# Patient Record
Sex: MALE | Race: WHITE | NOT HISPANIC OR LATINO | Employment: FULL TIME | URBAN - METROPOLITAN AREA
[De-identification: names, ages, dates, MRNs, and addresses within clinical notes are randomized per-mention and may not be internally consistent; named-entity substitution may affect disease eponyms.]

---

## 2018-02-14 ENCOUNTER — OFFICE VISIT (OUTPATIENT)
Dept: ORTHOPEDIC SURGERY | Facility: CLINIC | Age: 43
End: 2018-02-14

## 2018-02-14 VITALS
WEIGHT: 145.28 LBS | HEIGHT: 66 IN | DIASTOLIC BLOOD PRESSURE: 78 MMHG | BODY MASS INDEX: 23.35 KG/M2 | HEART RATE: 75 BPM | SYSTOLIC BLOOD PRESSURE: 108 MMHG

## 2018-02-14 DIAGNOSIS — M25.511 CHRONIC RIGHT SHOULDER PAIN: Primary | ICD-10-CM

## 2018-02-14 DIAGNOSIS — M19.022 PRIMARY OSTEOARTHRITIS OF LEFT ELBOW: ICD-10-CM

## 2018-02-14 DIAGNOSIS — G89.29 CHRONIC RIGHT SHOULDER PAIN: Primary | ICD-10-CM

## 2018-02-14 PROCEDURE — 99213 OFFICE O/P EST LOW 20 MIN: CPT | Performed by: ORTHOPAEDIC SURGERY

## 2018-02-14 NOTE — PROGRESS NOTES
[facility] [place] [facility]    Subjective     Chief Complaint   Patient presents with   • Right Shoulder - Pain   • Left Elbow - Pain        HPI    Hung Perales is a 42 y.o. male. He presents today for evaluation of right shoulder and left elbow pain.  The right shoulder has been bothering him for a few years now, worsening over that timeframe, which is moderate to severe, sharp and stabbing, associated with grinding.  It worsens with lifting.  Old history of injury to the shoulder when he was hit by a bull's horn.  Surgery was done in [place] in 2008.    He also recently injured his elbow, and was told he might have a fracture.  Pain is improving since the day of the injury a few weeks ago.      There is no problem list on file for this patient.    History reviewed. No pertinent past medical history.   Past Surgical History:   Procedure Laterality Date   • HIP SURGERY      GUNNAR   • KNEE SURGERY Left    • MANDIBLE SURGERY     • SHOULDER SURGERY        Family History   Problem Relation Age of Onset   • Cancer Father    • Heart attack Father      Social History     Social History   • Marital status:      Spouse name: N/A   • Number of children: N/A   • Years of education: N/A     Occupational History   • Not on file.     Social History Main Topics   • Smoking status: Former Smoker     Types: Cigarettes     Start date: 1990     Quit date: 2005   • Smokeless tobacco: Never Used   • Alcohol use Yes      Comment: occasional   • Drug use: No   • Sexual activity: Defer     Other Topics Concern   • Not on file     Social History Narrative   • No narrative on file      No current outpatient prescriptions on file prior to visit.     No current facility-administered medications on file prior to visit.       Allergies   Allergen Reactions   • Penicillins Hives        Review of Systems   Constitutional: Negative for activity change, appetite change, chills, diaphoresis, fatigue, fever and unexpected weight  "change.   HENT: Negative for congestion, dental problem, drooling, ear discharge, ear pain, facial swelling, hearing loss, mouth sores, nosebleeds, postnasal drip, rhinorrhea, sinus pressure, sneezing, sore throat, tinnitus, trouble swallowing and voice change.    Eyes: Negative for photophobia, pain, discharge, redness, itching and visual disturbance.   Respiratory: Negative for apnea, cough, choking, chest tightness, shortness of breath, wheezing and stridor.    Cardiovascular: Negative for chest pain, palpitations and leg swelling.   Gastrointestinal: Negative for abdominal distention, abdominal pain, anal bleeding, blood in stool, constipation, diarrhea, nausea, rectal pain and vomiting.   Endocrine: Negative for cold intolerance, heat intolerance, polydipsia, polyphagia and polyuria.   Genitourinary: Negative for decreased urine volume, difficulty urinating, dysuria, enuresis, flank pain, frequency, genital sores, hematuria and urgency.   Musculoskeletal: Positive for arthralgias. Negative for back pain, gait problem, joint swelling, myalgias, neck pain and neck stiffness.   Skin: Negative for color change, pallor, rash and wound.   Allergic/Immunologic: Negative for environmental allergies, food allergies and immunocompromised state.   Neurological: Negative for dizziness, tremors, seizures, syncope, facial asymmetry, speech difficulty, weakness, light-headedness, numbness and headaches.   Hematological: Negative for adenopathy. Does not bruise/bleed easily.   Psychiatric/Behavioral: Negative for agitation, behavioral problems, confusion, decreased concentration, dysphoric mood, hallucinations, self-injury, sleep disturbance and suicidal ideas. The patient is not nervous/anxious and is not hyperactive.         Objective      Physical Exam  /78  Pulse 75  Ht 167.5 cm (65.95\")  Wt 65.9 kg (145 lb 4.5 oz)  BMI 23.49 kg/m2    Body mass index is 23.49 kg/(m^2).    General:   Mental Status:  " Alert   Appearance: Cooperative, in no acute distress   Build and Nutrition: Well-nourished and well developed male   Orientation: Alert and oriented to person, place and time   Posture: Normal   Gait: Normal    Integument:   Right shoulder: Previous skin incisions are well-healed    Neurologic:   Sensation:    Right hand: Intact to light touch in the digits   Motor:  Right upper extremity: 5/5 deltoid, biceps, triceps, wrist flexors, wrist extensors, and interossei    Vascular:   Right upper extremity: 2+ radial pulse, prompt capillary refill    Upper Extremities:   Right Shoulder:    Tenderness:  None    Swelling:  None    Crepitus: None    Atrophy:  None    Range of motion:  External rotation:  70°       Forward flexion:  180°       Abduction:   180°  Instability:  None  Deformities:  None  Functional testing: Negative drop arm, negative lift-off, positive impingement    Integument:   Left elbow: No skin lesions, no rash, no ecchymosis    Neurologic:   Sensation:    Left hand: Intact to light touch in the digits   Motor:  Left upper extremity: 5/5 biceps, triceps, wrist flexors, wrist extensors, and interossei  Vascular:   Left upper extremity: 2+ radial pulse, prompt capillary refill    Upper Extremities:   Left Elbow:    Tenderness:  None    Effusion:  None    Swelling:  None    Crepitus:  None    Atrophy:  None    Range of motion:  Extension:  0°        Flexion:  140°       Pronation:  90°       Supination: 90°  Instability:  None  Deformities:  None      Imaging/Studies  Imaging Results (last 24 hours)     Procedure Component Value Units Date/Time    XR Shoulder 2+ View Right [[id number]] Resulted:  02/14/18 1720     Updated:  02/14/18 1721    Narrative:       Right Shoulder Radiographs  Indication: right shoulder pain  Views: AP, outlet and axillary views of the right shoulder    Comparison: no prior studies available for review    Findings:  Type III acromion, with evidence of previous distal clavicle  resection,   with a small calcification at the distal end of the clavicle in the   acromioclavicular joint interval, with a mildly high riding clavicle.      XR Elbow 3+ View Left [[id number]] Resulted:  02/14/18 1721     Updated:  02/14/18 1722    Narrative:       Left Elbow Radiographs  Indication: left elbow pain  Views: AP, lateral, and oblique views of the left elbow    Comparison: no prior studies available for review    Findings:  Mild arthritic changes are seen, primarily at the proximal radial ulnar   joint, with mild spurring anteriorly and posteriorly off of the olecranon.            Assessment and Plan     Hung was seen today for pain and pain.    Diagnoses and all orders for this visit:    Chronic right shoulder pain  -     XR Shoulder 2+ View Right  -     FL Contrast Injection CT / MRI; Future  -     MRI shoulder right arthrogram; Future    Primary osteoarthritis of left elbow  -     XR Elbow 3+ View Left        I reviewed my findings with patient today.  His right shoulder is bothering him, and I suspect that he has an element of impingement.  Also suspect a possible labral or biceps injury.  At this point, recommended an MRI with intra-articular contrast.  I will see him back after that is completed for review.    With regards to his left elbow, that is improving.  I see no evidence of fracture.  He does have some mild arthritis in the elbow.  Conservative treatment was recommended.    Return for After Imaging Study.      Medical Decision Making  Data/Risk: radiology tests and independent visualization of imaging, lab tests, or EMG/NCV      Carter Abreu MD  02/14/18  5:44 PM

## 2018-02-22 ENCOUNTER — TELEPHONE (OUTPATIENT)
Dept: ORTHOPEDIC SURGERY | Facility: CLINIC | Age: 43
End: 2018-02-22

## 2018-02-22 DIAGNOSIS — G89.29 CHRONIC RIGHT SHOULDER PAIN: Primary | ICD-10-CM

## 2018-02-22 DIAGNOSIS — M25.511 CHRONIC RIGHT SHOULDER PAIN: Primary | ICD-10-CM

## 2018-03-16 ENCOUNTER — HOSPITAL ENCOUNTER (OUTPATIENT)
Dept: GENERAL RADIOLOGY | Facility: HOSPITAL | Age: 43
Discharge: HOME | End: 2018-03-16
Attending: ORTHOPAEDIC SURGERY

## 2018-03-16 ENCOUNTER — HOSPITAL ENCOUNTER (OUTPATIENT)
Dept: MRI IMAGING | Facility: HOSPITAL | Age: 43
Discharge: HOME | End: 2018-03-16
Attending: ORTHOPAEDIC SURGERY | Admitting: ORTHOPAEDIC SURGERY

## 2018-03-16 DIAGNOSIS — G89.29 CHRONIC RIGHT SHOULDER PAIN: ICD-10-CM

## 2018-03-16 DIAGNOSIS — M25.511 CHRONIC RIGHT SHOULDER PAIN: ICD-10-CM

## 2018-03-16 PROCEDURE — 77002 NEEDLE LOCALIZATION BY XRAY: CPT

## 2018-03-16 PROCEDURE — A9577 INJ MULTIHANCE: HCPCS | Performed by: ORTHOPAEDIC SURGERY

## 2018-03-16 PROCEDURE — 73222 MRI JOINT UPR EXTREM W/DYE: CPT

## 2018-03-16 PROCEDURE — 0 GADOBENATE DIMEGLUMINE 529 MG/ML SOLUTION: Performed by: ORTHOPAEDIC SURGERY

## 2018-03-16 PROCEDURE — 0 IOPAMIDOL 61 % SOLUTION: Performed by: ORTHOPAEDIC SURGERY

## 2018-03-16 RX ORDER — LIDOCAINE HYDROCHLORIDE 10 MG/ML
5 INJECTION, SOLUTION INFILTRATION; PERINEURAL ONCE
Status: COMPLETED | OUTPATIENT
Start: 2018-03-16 | End: 2018-03-16

## 2018-03-16 RX ORDER — LIDOCAINE HYDROCHLORIDE 10 MG/ML
5 INJECTION, SOLUTION EPIDURAL; INFILTRATION; INTRACAUDAL; PERINEURAL ONCE
Status: DISCONTINUED | OUTPATIENT
Start: 2018-03-16 | End: 2018-03-17 | Stop reason: HOSPADM

## 2018-03-16 RX ADMIN — GADOBENATE DIMEGLUMINE 1 ML: 529 INJECTION, SOLUTION INTRAVENOUS at 13:04

## 2018-03-16 RX ADMIN — LIDOCAINE HYDROCHLORIDE 5 ML: 10 INJECTION, SOLUTION INFILTRATION; PERINEURAL at 13:05

## 2018-03-16 RX ADMIN — IOPAMIDOL 10 ML: 612 INJECTION, SOLUTION INTRAVENOUS at 13:04

## 2018-03-19 ENCOUNTER — OFFICE VISIT (OUTPATIENT)
Dept: ORTHOPEDIC SURGERY | Facility: CLINIC | Age: 43
End: 2018-03-19

## 2018-03-19 VITALS
WEIGHT: 142.42 LBS | DIASTOLIC BLOOD PRESSURE: 85 MMHG | BODY MASS INDEX: 22.89 KG/M2 | SYSTOLIC BLOOD PRESSURE: 109 MMHG | HEIGHT: 66 IN | HEART RATE: 66 BPM

## 2018-03-19 DIAGNOSIS — M75.121 COMPLETE TEAR OF RIGHT ROTATOR CUFF: Primary | ICD-10-CM

## 2018-03-19 PROCEDURE — 99214 OFFICE O/P EST MOD 30 MIN: CPT | Performed by: ORTHOPAEDIC SURGERY

## 2018-03-19 NOTE — PROGRESS NOTES
[facility] [place] [facility]    Subjective     Chief Complaint   Patient presents with   • Right Shoulder - Follow-up     Post MRI         HPI    Hung Perales is a 43 y.o. male. He follows up today for his right shoulder.  MRI has been completed, and he is here today for results.  Pain continues to be moderate, stabbing, associated with popping, grinding, stiffness and giving way.      There is no problem list on file for this patient.    History reviewed. No pertinent past medical history.   Past Surgical History:   Procedure Laterality Date   • HIP SURGERY      GUNNAR   • KNEE SURGERY Left    • MANDIBLE SURGERY     • SHOULDER SURGERY        Family History   Problem Relation Age of Onset   • Cancer Father    • Heart attack Father      Social History     Social History   • Marital status:      Spouse name: N/A   • Number of children: N/A   • Years of education: N/A     Occupational History   • Not on file.     Social History Main Topics   • Smoking status: Former Smoker     Types: Cigarettes     Start date: 1990     Quit date: 2005   • Smokeless tobacco: Never Used   • Alcohol use Yes      Comment: occasional   • Drug use: No   • Sexual activity: Defer     Other Topics Concern   • Not on file     Social History Narrative   • No narrative on file      No current outpatient prescriptions on file prior to visit.     No current facility-administered medications on file prior to visit.       Allergies   Allergen Reactions   • Penicillins Hives        Review of Systems   Constitutional: Negative for activity change, appetite change, chills, diaphoresis, fatigue, fever and unexpected weight change.   HENT: Negative for congestion, dental problem, drooling, ear discharge, ear pain, facial swelling, hearing loss, mouth sores, nosebleeds, postnasal drip, rhinorrhea, sinus pressure, sneezing, sore throat, tinnitus, trouble swallowing and voice change.    Eyes: Negative for photophobia, pain, discharge,  "redness, itching and visual disturbance.   Respiratory: Negative for apnea, cough, choking, chest tightness, shortness of breath, wheezing and stridor.    Cardiovascular: Negative for chest pain, palpitations and leg swelling.   Gastrointestinal: Negative for abdominal distention, abdominal pain, anal bleeding, blood in stool, constipation, diarrhea, nausea, rectal pain and vomiting.   Endocrine: Negative for cold intolerance, heat intolerance, polydipsia, polyphagia and polyuria.   Genitourinary: Negative for decreased urine volume, difficulty urinating, dysuria, enuresis, flank pain, frequency, genital sores, hematuria and urgency.   Musculoskeletal: Positive for joint swelling. Negative for arthralgias, back pain, gait problem, myalgias, neck pain and neck stiffness.        Shoulder Pain    Skin: Negative for color change, pallor, rash and wound.   Allergic/Immunologic: Negative for environmental allergies, food allergies and immunocompromised state.   Neurological: Negative for dizziness, tremors, seizures, syncope, facial asymmetry, speech difficulty, weakness, light-headedness, numbness and headaches.   Hematological: Negative for adenopathy. Does not bruise/bleed easily.   Psychiatric/Behavioral: Negative for agitation, behavioral problems, confusion, decreased concentration, dysphoric mood, hallucinations, self-injury, sleep disturbance and suicidal ideas. The patient is not nervous/anxious and is not hyperactive.         Objective      Physical Exam  /85   Pulse 66   Ht 167.5 cm (65.95\")   Wt 64.6 kg (142 lb 6.7 oz)   BMI 23.03 kg/m²     Body mass index is 23.03 kg/m².    General:   Mental Status:  Alert   Appearance: Cooperative, in no acute distress   Build and Nutrition: Well-nourished and well developed male   Orientation: Alert and oriented to person, place and time   Posture: Normal   Gait: Normal    Integument:   Right shoulder: No skin lesions, no rash, no ecchymosis    Upper " Extremities:   Right Shoulder:    Tenderness:  None    Swelling:  None    Range of motion:  External rotation:  80°       Forward flexion:  180°       Abduction:   180°  Deformities:  None  Functional testing: Negative drop arm, negative lift-off        Imaging/Studies  MRI right shoulder:  IMPRESSION:     Evidence of abnormal injury and dissociation of the upper third of the  anterior labrum which resumes normal appearance in the middle and lower  third of the anterior labrum. Therefore labral instability anteriorly  should not be present because of the critical portion of the anterior  labrum being intact.     There is evidence of disruption corrugation with abnormal signal of the  middle glenohumeral ligament.     There is minimal posterior capsular stripping although there is no  evidence of posterior labral tear.     Subscapularis and teres minor are intact.     Previous distal clavicular osteotomy has been performed.     There is full-thickness inhomogeneous tear of the rotator cuff tendons  with only 1.5 cm of central cuff retraction but the anterior most and  posterior-most attachments are intact to prevent high-grade retraction  of the cuff. There is abnormal signal and there is diffuse contrast  through the rotator cuff tear into the subacromial and subdeltoid bursal  spaces.     The biceps anchor is intact but there is evidence of an undersurface  tear of the superior labrum diffusely from the anterior attachment to  its posterior extent. Superior labral complete dissociation is not  identified but this is a significant superior labral abnormality.     There is marked arthropathic irregular bone fragmentation along the  greater tuberosity of the humerus.      Assessment and Plan     Hung was seen today for follow-up.    Diagnoses and all orders for this visit:    Complete tear of right rotator cuff  -     External Facility Surgical/Procedural Request        I reviewed my findings with patient today.  His  MR arthrogram does demonstrate a rotator cuff tear, we discussed treatment options.  No improvement with conservative treatment, therefore he would like to proceed with surgical repair.  The possibility that the tear may not be repairable has been discussed.  Please see my counseling note for details.  We will plan for a right shoulder arthroscopy with subacromial decompression, and mini open rotator cuff repair at a mutually convenient time.    Surgical Counseling     After examining the patient and reviewing the diagnostic studies, I discussed the non-operative and surgical options for their shoulder problem. The risks, benefits and alternatives to surgical intervention were discussed, and the patient wishes to proceed with operative intervention.  Risks were discussed, which included, but are not limited to: bleeding, infection, damage to blood vessels and nerves, no improvement in symptoms, worsening symptoms, incomplete pain relief,need for further surgery, shoulder stiffness, failure of the repair and anesthetic complications.  The patient understands, consents, and their questions were answered.  The typical rehabilitative course was also discussed.  We will schedule surgery at a mutually convenient time in the near future.    Return for For surgery as planned.      Medical Decision Making  Management Options : major surgery with risk factors  Data/Risk: independent visualization of imaging, lab tests, or EMG/NCV      Carter Abreu MD  03/19/18  10:39 AM

## 2018-05-11 ENCOUNTER — OUTSIDE FACILITY SERVICE (OUTPATIENT)
Dept: ORTHOPEDIC SURGERY | Facility: CLINIC | Age: 43
End: 2018-05-11

## 2018-05-11 PROCEDURE — 29823 SHO ARTHRS SRG XTNSV DBRDMT: CPT | Performed by: ORTHOPAEDIC SURGERY

## 2018-05-11 PROCEDURE — 23412 REPAIR ROTATOR CUFF CHRONIC: CPT | Performed by: ORTHOPAEDIC SURGERY

## 2018-05-23 ENCOUNTER — OFFICE VISIT (OUTPATIENT)
Dept: ORTHOPEDIC SURGERY | Facility: CLINIC | Age: 43
End: 2018-05-23

## 2018-05-23 DIAGNOSIS — Z47.89 ORTHOPEDIC AFTERCARE: Primary | ICD-10-CM

## 2018-05-23 PROCEDURE — 99024 POSTOP FOLLOW-UP VISIT: CPT | Performed by: ORTHOPAEDIC SURGERY

## 2018-05-23 RX ORDER — HYDROCODONE BITARTRATE AND ACETAMINOPHEN 5; 325 MG/1; MG/1
TABLET ORAL
COMMUNITY
Start: 2018-05-11 | End: 2018-05-23

## 2018-05-23 NOTE — PROGRESS NOTES
[facility] – [place] [facility] Note    Subjective     Chief Complaint   Patient presents with   • Post-op     2 weeks s/p: Right shoulder arthroscopy 5/11/18        HPI    Hung Perales is a 43 y.o. male. Follows up today status post right shoulder arthroscopy with rotator cuff repair.  He's doing well today.  No pain.  Doing his pendulum and elbow exercises only.      There is no problem list on file for this patient.    History reviewed. No pertinent past medical history.   Past Surgical History:   Procedure Laterality Date   • HIP SURGERY      GUNNAR   • KNEE SURGERY Left    • MANDIBLE SURGERY     • SHOULDER SURGERY        Family History   Problem Relation Age of Onset   • Cancer Father    • Heart attack Father      Social History     Social History   • Marital status:      Spouse name: N/A   • Number of children: N/A   • Years of education: N/A     Occupational History   • Not on file.     Social History Main Topics   • Smoking status: Former Smoker     Types: Cigarettes     Start date: 1990     Quit date: 2005   • Smokeless tobacco: Never Used   • Alcohol use Yes      Comment: occasional   • Drug use: No   • Sexual activity: Defer     Other Topics Concern   • Not on file     Social History Narrative   • No narrative on file      No current outpatient prescriptions on file prior to visit.     No current facility-administered medications on file prior to visit.       Allergies   Allergen Reactions   • Penicillins Hives        Review of Systems   Constitutional: Negative for activity change, appetite change, chills, diaphoresis, fatigue, fever and unexpected weight change.   HENT: Negative for congestion, dental problem, drooling, ear discharge, ear pain, facial swelling, hearing loss, mouth sores, nosebleeds, postnasal drip, rhinorrhea, sinus pressure, sneezing, sore throat, tinnitus, trouble swallowing and voice change.    Eyes: Negative for photophobia, pain, discharge, redness, itching and visual  disturbance.   Respiratory: Negative for apnea, cough, choking, chest tightness, shortness of breath, wheezing and stridor.    Cardiovascular: Negative for chest pain, palpitations and leg swelling.   Gastrointestinal: Negative for abdominal distention, abdominal pain, anal bleeding, blood in stool, constipation, diarrhea, nausea, rectal pain and vomiting.   Endocrine: Negative for cold intolerance, heat intolerance, polydipsia, polyphagia and polyuria.   Genitourinary: Negative for decreased urine volume, difficulty urinating, dysuria, enuresis, flank pain, frequency, genital sores, hematuria and urgency.   Musculoskeletal: Positive for arthralgias (s/p shoulder scope). Negative for back pain, gait problem, joint swelling, myalgias, neck pain and neck stiffness.   Skin: Negative for color change, pallor, rash and wound.   Allergic/Immunologic: Negative for environmental allergies, food allergies and immunocompromised state.   Neurological: Negative for dizziness, tremors, seizures, syncope, facial asymmetry, speech difficulty, weakness, light-headedness, numbness and headaches.   Hematological: Negative for adenopathy. Does not bruise/bleed easily.   Psychiatric/Behavioral: Negative for agitation, behavioral problems, confusion, decreased concentration, dysphoric mood, hallucinations, self-injury, sleep disturbance and suicidal ideas. The patient is not nervous/anxious and is not hyperactive.         Objective      Physical Exam  There were no vitals taken for this visit.    There is no height or weight on file to calculate BMI.    General:   Mental Status:  Alert   Appearance: Cooperative, in no acute distress   Build and Nutrition: Well-nourished and well developed male   Orientation: Alert and oriented to person, place and time   Posture: Normal   Gait: Normal    Integument:   Right shoulder: Wounds are well-healed with no signs of infection    Upper Extremities:   Right Shoulder: Sensation and motor intact in  the upper extremity          Assessment and Plan     Hung was seen today for post-op.    Diagnoses and all orders for this visit:    Orthopedic aftercare        I reviewed my findings with patient today.  He's doing well following his right shoulder rotator cuff repair, and I will see him back in 2 weeks, at which point we will more than likely initiate physical therapy.  I will see him back sooner for any problems.    Return in about 2 weeks (around 6/6/2018).        Carter Abreu MD  05/23/18  10:39 AM

## 2018-06-06 ENCOUNTER — OFFICE VISIT (OUTPATIENT)
Dept: ORTHOPEDIC SURGERY | Facility: CLINIC | Age: 43
End: 2018-06-06

## 2018-06-06 DIAGNOSIS — M75.121 COMPLETE TEAR OF RIGHT ROTATOR CUFF: ICD-10-CM

## 2018-06-06 DIAGNOSIS — Z47.89 ORTHOPEDIC AFTERCARE: Primary | ICD-10-CM

## 2018-06-06 PROCEDURE — 99024 POSTOP FOLLOW-UP VISIT: CPT | Performed by: PHYSICIAN ASSISTANT

## 2018-06-06 RX ORDER — NAPROXEN SODIUM 220 MG
220 TABLET ORAL AS NEEDED
COMMUNITY
End: 2019-09-09

## 2018-06-06 RX ORDER — ACETAMINOPHEN 500 MG
500 TABLET ORAL AS NEEDED
COMMUNITY
End: 2019-09-09

## 2018-06-06 NOTE — PROGRESS NOTES
[facility] – [place] [facility] Note    Subjective     Patient: Hung Perales  : 1975    Primary Care Provider: Oscar Baez MD    Requesting Provider: As above    Post-op (2 weeks - - Rt shoulder arthroscopy 18)      History    Chief Complaint: Status post right rotator cuff repair 18 with Dr. Abreu    History of Present Illness: Patient presents 1 month status post right rotator cuff repair.  Patient reports he has been compliant with using his sling and coming out to do pendulum exercises.  He reports minimal pain in the shoulder.  No new symptoms.    No current outpatient prescriptions on file prior to visit.     No current facility-administered medications on file prior to visit.       Allergies   Allergen Reactions   • Penicillins Hives      History reviewed. No pertinent past medical history.  Past Surgical History:   Procedure Laterality Date   • HIP SURGERY      GUNNAR   • KNEE SURGERY Left    • MANDIBLE SURGERY     • SHOULDER SURGERY Right     18- [facility]     Family History   Problem Relation Age of Onset   • Cancer Father    • Heart attack Father       Social History     Social History   • Marital status:      Spouse name: N/A   • Number of children: N/A   • Years of education: N/A     Occupational History   • Not on file.     Social History Main Topics   • Smoking status: Former Smoker     Types: Cigarettes     Start date:      Quit date:    • Smokeless tobacco: Never Used   • Alcohol use Yes      Comment: occasional   • Drug use: No   • Sexual activity: Defer     Other Topics Concern   • Not on file     Social History Narrative   • No narrative on file        Review of Systems    The following portions of the patient's history were reviewed and updated as appropriate: allergies, current medications, past family history, past medical history, past social history, past surgical history and problem list.      Objective      Physical Exam  There were no vitals taken  for this visit.    There is no height or weight on file to calculate BMI.      Ortho Exam  Right shoulder exam:  Incisions well healed  Forward flexion 90°, abduction 90°, external rotation 20°  Neurovascular intact distally    Medical Decision Making    Data Review:   none    Assessment:  1. Orthopedic aftercare    2. Complete tear of right rotator cuff        Plan:  Doing well 1 month status post right rotator cuff repair.  Patient's incisions are well-healed.  Plan today is that he begin physical therapy working on strength motion and scapular stabilization.  He can continue to use the sling as needed for the next couple of weeks.  He'll return to work on 6/18/18 with light duty and no lifting with right upper extremity.  He'll return to see Dr. Abreu in 1 month or sooner if needed.    Patient history, diagnosis and treatment plan discussed with Dr. Abreu.        Dolores Gonzales PA-C  06/06/18  11:22 AM

## 2018-06-12 ENCOUNTER — TELEPHONE (OUTPATIENT)
Dept: ORTHOPEDIC SURGERY | Facility: CLINIC | Age: 43
End: 2018-06-12

## 2018-06-12 NOTE — TELEPHONE ENCOUNTER
I called the patient to let him know that Dolores is releasing the patient to drive, but that it is up to his employer if he can drive the company car.  He had no further questions.  Kesha

## 2018-06-12 NOTE — TELEPHONE ENCOUNTER
PATIENT WANTS TO KNOW IF HE WOULD STILL BE ALLOWED TO DRIVE HIS COMPANY VEHICLE WHEN RETURNING TO WORK ON 6/18.

## 2018-07-11 ENCOUNTER — OFFICE VISIT (OUTPATIENT)
Dept: ORTHOPEDIC SURGERY | Facility: CLINIC | Age: 43
End: 2018-07-11

## 2018-07-11 DIAGNOSIS — Z47.89 ORTHOPEDIC AFTERCARE: Primary | ICD-10-CM

## 2018-07-11 PROCEDURE — 99024 POSTOP FOLLOW-UP VISIT: CPT | Performed by: PHYSICIAN ASSISTANT

## 2018-07-11 NOTE — PROGRESS NOTES
[facility] – [place], [place] Orthopaedic Surgery Clinic Note    Subjective     Patient: Hung Perales  : 1975    Primary Care Provider: Oscar Baez MD    Requesting Provider: As above    Post-op Follow-up (8 weeks s/p Rt shoulder arthroscopy 18)      History    History of Present Illness: Follow-up right rotator cuff repair with Dr. Abreu 18.  Patient is return to work.  He is doing physical therapy.  He reports no pain and no new symptoms.    Current Outpatient Prescriptions on File Prior to Visit   Medication Sig Dispense Refill   • acetaminophen (TYLENOL) 500 MG tablet Take 500 mg by mouth As Needed for Mild Pain .     • naproxen sodium (ALEVE) 220 MG tablet Take 220 mg by mouth As Needed.       No current facility-administered medications on file prior to visit.       Allergies   Allergen Reactions   • Penicillins Hives      History reviewed. No pertinent past medical history.  Past Surgical History:   Procedure Laterality Date   • HIP SURGERY      GUNNAR   • KNEE SURGERY Left    • MANDIBLE SURGERY     • SHOULDER SURGERY Right     18- [facility]     Family History   Problem Relation Age of Onset   • Cancer Father    • Heart attack Father       Social History     Social History   • Marital status:      Spouse name: N/A   • Number of children: N/A   • Years of education: N/A     Occupational History   • Not on file.     Social History Main Topics   • Smoking status: Former Smoker     Types: Cigarettes     Start date:      Quit date:    • Smokeless tobacco: Never Used   • Alcohol use Yes      Comment: occasional   • Drug use: No   • Sexual activity: Defer     Other Topics Concern   • Not on file     Social History Narrative   • No narrative on file        Review of Systems    The following portions of the patient's history were reviewed and updated as appropriate: allergies, current medications, past family history, past medical history, past social history, past surgical history and problem  list.      Objective      Physical Exam  There were no vitals taken for this visit.    There is no height or weight on file to calculate BMI.    Ortho Exam  Musculoskeletal   Upper Extremity   Right Shoulder     Inspection and Palpation:     Tenderness - none with well-healed portal incisions    Crepitus - none    Sensation is normal    Examination reveals no ecchymosis.      Strength and Tone:    Supraspinatus - 5/5    External Rotators-5/5    Infraspinatus - 5/5    Subscapularis - 5/5    Deltoid - 5/5     Range of Motion   Right shoulder:    Internal Rotation: ROM - T7    External Rotation: AROM - 60 degrees    Elevation through flexion: AROM - 160 degrees    Left Shoulder:    Internal Rotation: ROM - T7    External Rotation: AROM - 80 degrees    Elevation through flexion: AROM - 180 degrees           Medical Decision Making    Data Review:   none    Assessment:  1. Orthopedic aftercare        Plan:  Doing well status post right rotator cuff repair with Dr. Abreu 5/11/18.  Patient reports no pain.  He has good motion and strength.  Encouraged his him to continue his therapy and exercises at home.  He'll return in 2 months to see Dr. Abreu or sooner if needed.    Patient history, diagnosis and treatment plan discussed with Dr. Abreu.      Dolores Gonzales PA-C  07/12/18  2:09 PM

## 2018-09-12 ENCOUNTER — OFFICE VISIT (OUTPATIENT)
Dept: ORTHOPEDIC SURGERY | Facility: CLINIC | Age: 43
End: 2018-09-12

## 2018-09-12 VITALS — HEART RATE: 87 BPM | HEIGHT: 66 IN | WEIGHT: 140.21 LBS | BODY MASS INDEX: 22.53 KG/M2 | OXYGEN SATURATION: 98 %

## 2018-09-12 DIAGNOSIS — Z47.89 ORTHOPEDIC AFTERCARE: Primary | ICD-10-CM

## 2018-09-12 PROCEDURE — 99212 OFFICE O/P EST SF 10 MIN: CPT | Performed by: ORTHOPAEDIC SURGERY

## 2018-09-12 NOTE — PROGRESS NOTES
[facility] [place] [place]    Subjective     Chief Complaint   Patient presents with   • Follow-up     2 month follow up - 4 months status post Rt shoulder arthroscopy 5/11/18        HPI    Hung Perales is a 43 y.o. male.  He follows up today for his right shoulder.  He is doing well today.  No complaints.  Does have some mild anterior pain on occasion.  He still doing physical therapy.  Symptoms have improved compared to his preoperative symptoms.      There is no problem list on file for this patient.    History reviewed. No pertinent past medical history.   Past Surgical History:   Procedure Laterality Date   • HIP SURGERY      GUNNAR   • KNEE SURGERY Left    • MANDIBLE SURGERY     • SHOULDER SURGERY Right     5/11/18- [facility]      Family History   Problem Relation Age of Onset   • Cancer Father    • Heart attack Father      Social History     Social History   • Marital status:      Spouse name: N/A   • Number of children: N/A   • Years of education: N/A     Occupational History   • Not on file.     Social History Main Topics   • Smoking status: Former Smoker     Types: Cigarettes     Start date: 1990     Quit date: 2005   • Smokeless tobacco: Never Used   • Alcohol use Yes      Comment: occasional   • Drug use: No   • Sexual activity: Defer     Other Topics Concern   • Not on file     Social History Narrative   • No narrative on file      Current Outpatient Prescriptions on File Prior to Visit   Medication Sig Dispense Refill   • acetaminophen (TYLENOL) 500 MG tablet Take 500 mg by mouth As Needed for Mild Pain .     • naproxen sodium (ALEVE) 220 MG tablet Take 220 mg by mouth As Needed.       No current facility-administered medications on file prior to visit.       Allergies   Allergen Reactions   • Penicillins Hives        Review of Systems   Constitutional: Negative.    HENT: Negative.    Eyes: Negative.    Respiratory: Negative.    Cardiovascular: Negative.    Gastrointestinal: Negative.   "  Endocrine: Negative.    Genitourinary: Negative.    Musculoskeletal: Positive for arthralgias.   Skin: Negative.    Allergic/Immunologic: Negative.    Neurological: Negative.    Hematological: Negative.    Psychiatric/Behavioral: Negative.         Objective      Physical Exam  Pulse 87   Ht 167.5 cm (65.95\")   Wt 63.6 kg (140 lb 3.4 oz)   SpO2 98%   BMI 22.67 kg/m²     Body mass index is 22.67 kg/m².    General:   Mental Status:  Alert   Appearance: Cooperative, in no acute distress   Build and Nutrition: Well-nourished and well developed male   Orientation: Alert and oriented to person, place and time   Posture: Normal   Gait: Normal    Integument:   Right shoulder: Wound is well-healed with no signs of infection    Upper Extremities:   Right Shoulder:    Tenderness:  None    Swelling:  None    Range of motion:  External rotation:  50°       Forward flexion:  180°       Abduction:   180°  Deformities:  None  Functional testing: Negative drop arm, negative lift-off, negative impingement            Assessment and Plan     Hung was seen today for follow-up.    Diagnoses and all orders for this visit:    Orthopedic aftercare        I reviewed my findings with patient today.  He is doing well following his rotator cuff repair.  He will continue to finish out his physical therapy, and I will see him back if there are any problems with the shoulder in the future.  He is pleased with results.    Return if symptoms worsen or fail to improve.      Medical Decision Making  Management Options : physical/occupational therapy      Carter Abreu MD  09/12/18  4:12 PM  "

## 2019-09-09 ENCOUNTER — OFFICE VISIT (OUTPATIENT)
Dept: ORTHOPEDIC SURGERY | Facility: CLINIC | Age: 44
End: 2019-09-09

## 2019-09-09 VITALS — OXYGEN SATURATION: 99 % | HEART RATE: 94 BPM | BODY MASS INDEX: 23.42 KG/M2 | WEIGHT: 145.72 LBS | HEIGHT: 66 IN

## 2019-09-09 DIAGNOSIS — M25.511 CHRONIC RIGHT SHOULDER PAIN: Primary | ICD-10-CM

## 2019-09-09 DIAGNOSIS — G89.29 CHRONIC RIGHT SHOULDER PAIN: Primary | ICD-10-CM

## 2019-09-09 PROCEDURE — 99214 OFFICE O/P EST MOD 30 MIN: CPT | Performed by: ORTHOPAEDIC SURGERY

## 2019-09-09 NOTE — PROGRESS NOTES
[facility] – [place] Orthopaedic Surgery Clinic Note    Subjective     Chief Complaint   Patient presents with   • Right Shoulder - Pain     1 year f/u, Rt shoulder arthroscopy 18        HPI    Hung ORDONEZ Angella is a 44 y.o. male.  He presents today for evaluation of right shoulder pain.  He is had pain for 6 months, which is been slowly worsening over that timeframe, with pain in the bicipital groove region in particular.  Giving way on occasion, and specifically when he went to go swat a wasp with his hat he had the onset of acute pain in that area.  The pain is aching, stabbing and shooting.  History of right shoulder arthroscopy with rotator cuff repair in May 2018.      There is no problem list on file for this patient.    History reviewed. No pertinent past medical history.   Past Surgical History:   Procedure Laterality Date   • HIP SURGERY      GUNNAR   • KNEE SURGERY Left    • MANDIBLE SURGERY     • SHOULDER SURGERY Right     18- [facility]      Family History   Problem Relation Age of Onset   • Cancer Father    • Heart attack Father      Social History     Socioeconomic History   • Marital status:      Spouse name: Not on file   • Number of children: Not on file   • Years of education: Not on file   • Highest education level: Not on file   Tobacco Use   • Smoking status: Former Smoker     Types: Cigarettes     Start date:      Last attempt to quit:      Years since quittin.6   • Smokeless tobacco: Never Used   Substance and Sexual Activity   • Alcohol use: Yes     Comment: occasional   • Drug use: No   • Sexual activity: Defer      Current Outpatient Medications on File Prior to Visit   Medication Sig Dispense Refill   • [DISCONTINUED] acetaminophen (TYLENOL) 500 MG tablet Take 500 mg by mouth As Needed for Mild Pain .     • [DISCONTINUED] naproxen sodium (ALEVE) 220 MG tablet Take 220 mg by mouth As Needed.       No current facility-administered medications on file prior to visit.       Allergies    Allergen Reactions   • Penicillins Hives        Review of Systems   Constitutional: Negative.  Negative for activity change, appetite change, chills, diaphoresis, fatigue, fever and unexpected weight change.   HENT: Negative.  Negative for congestion, dental problem, drooling, ear discharge, ear pain, facial swelling, hearing loss, mouth sores, nosebleeds, postnasal drip, rhinorrhea, sinus pressure, sneezing, sore throat, tinnitus, trouble swallowing and voice change.    Eyes: Negative.  Negative for photophobia, pain, discharge, redness, itching and visual disturbance.   Respiratory: Negative.  Negative for apnea, cough, choking, chest tightness, shortness of breath, wheezing and stridor.    Cardiovascular: Negative.  Negative for chest pain, palpitations and leg swelling.   Gastrointestinal: Negative.  Negative for abdominal distention, abdominal pain, anal bleeding, blood in stool, constipation, diarrhea, nausea, rectal pain and vomiting.   Endocrine: Negative.  Negative for cold intolerance, heat intolerance, polydipsia, polyphagia and polyuria.   Genitourinary: Negative.  Negative for decreased urine volume, difficulty urinating, dysuria, enuresis, flank pain, frequency, genital sores, hematuria and urgency.   Musculoskeletal: Positive for arthralgias. Negative for back pain, gait problem, joint swelling, myalgias, neck pain and neck stiffness.   Skin: Negative.  Negative for color change, pallor, rash and wound.   Allergic/Immunologic: Negative.  Negative for environmental allergies, food allergies and immunocompromised state.   Neurological: Negative.  Negative for dizziness, tremors, seizures, syncope, facial asymmetry, speech difficulty, weakness, light-headedness, numbness and headaches.   Hematological: Negative.  Negative for adenopathy. Does not bruise/bleed easily.   Psychiatric/Behavioral: Negative.  Negative for agitation, behavioral problems, confusion, decreased concentration, dysphoric mood,  "hallucinations, self-injury, sleep disturbance and suicidal ideas. The patient is not nervous/anxious and is not hyperactive.         Objective      Physical Exam  Pulse 94   Ht 167.5 cm (65.95\")   Wt 66.1 kg (145 lb 11.6 oz)   SpO2 99%   BMI 23.56 kg/m²     Body mass index is 23.56 kg/m².    General:   Mental Status:  Alert   Appearance: Cooperative, in no acute distress   Build and Nutrition: Well-nourished well-developed male   Orientation: Alert and oriented to person, place and time   Posture: Normal   Gait: Normal    Integument:   Right shoulder: No skin lesions, no rash, no ecchymosis    Upper Extremities:   Right Shoulder:    Tenderness:  None    Swelling:  None    Range of motion:  External rotation:  90°       Forward flexion:  180°       Abduction:   180°  Deformities:  None  Functional testing: Negative drop arm, negative lift-off, positive mild impingement        Imaging/Studies      Imaging Results (last 24 hours)     Procedure Component Value Units Date/Time    XR Shoulder 2+ View Right [[id number]] Resulted:  09/09/19 1641     Updated:  09/09/19 1643    Narrative:       Right Shoulder Radiographs  Indication: right shoulder pain  Views: AP, outlet and axillary views of the right shoulder    Comparison: 2/14/2018    Findings:  No acute bony abnormalities are appreciated.  Distal clavicle resection   level is appropriate, and the acromioplasty is also still well situated   compared to the previous films, with irregularity in the greater   tuberosity region secondary to previous rotator cuff anchors, with no   other unusual bony features.            Assessment and Plan     Hung was seen today for pain.    Diagnoses and all orders for this visit:    Chronic right shoulder pain  -     XR Shoulder 2+ View Right  -     FL Contrast Injection CT / MRI; Future  -     MRI Shoulder Right With & Without Contrast; Future        1. Chronic right shoulder pain        I reviewed my findings with the patient " today.  He is having shoulder pain, and had a previous rotator cuff repair.  Symptoms are in the bicipital groove.  Symptoms have been worsening over the past 6 months.  At this point I recommended an MRI with intra-articular contrast, and I will see him back after that has been completed for review.  I will see him back sooner for any problems.    Return for After Imaging Study.      Medical Decision Making  Data/Risk: radiology tests and independent visualization of imaging, lab tests, or EMG/NCV      Carter Abreu MD  09/09/19  5:57 PM

## 2019-09-12 DIAGNOSIS — M25.511 CHRONIC RIGHT SHOULDER PAIN: Primary | ICD-10-CM

## 2019-09-12 DIAGNOSIS — G89.29 CHRONIC RIGHT SHOULDER PAIN: Primary | ICD-10-CM

## 2019-10-03 ENCOUNTER — HOSPITAL ENCOUNTER (OUTPATIENT)
Dept: MRI IMAGING | Facility: HOSPITAL | Age: 44
Discharge: HOME | End: 2019-10-03
Admitting: ORTHOPAEDIC SURGERY

## 2019-10-03 ENCOUNTER — HOSPITAL ENCOUNTER (OUTPATIENT)
Dept: GENERAL RADIOLOGY | Facility: HOSPITAL | Age: 44
Discharge: HOME | End: 2019-10-03

## 2019-10-03 DIAGNOSIS — G89.29 CHRONIC RIGHT SHOULDER PAIN: ICD-10-CM

## 2019-10-03 DIAGNOSIS — M25.511 CHRONIC RIGHT SHOULDER PAIN: ICD-10-CM

## 2019-10-03 PROCEDURE — 77002 NEEDLE LOCALIZATION BY XRAY: CPT

## 2019-10-03 PROCEDURE — A9577 INJ MULTIHANCE: HCPCS | Performed by: ORTHOPAEDIC SURGERY

## 2019-10-03 PROCEDURE — 0 GADOBENATE DIMEGLUMINE 529 MG/ML SOLUTION: Performed by: ORTHOPAEDIC SURGERY

## 2019-10-03 PROCEDURE — 73222 MRI JOINT UPR EXTREM W/DYE: CPT

## 2019-10-03 PROCEDURE — 25010000002 IOPAMIDOL 61 % SOLUTION: Performed by: ORTHOPAEDIC SURGERY

## 2019-10-03 PROCEDURE — 25010000003 LIDOCAINE 1 % SOLUTION: Performed by: RADIOLOGY

## 2019-10-03 RX ORDER — LIDOCAINE HYDROCHLORIDE 10 MG/ML
5 INJECTION, SOLUTION INFILTRATION; PERINEURAL ONCE
Status: COMPLETED | OUTPATIENT
Start: 2019-10-03 | End: 2019-10-03

## 2019-10-03 RX ADMIN — LIDOCAINE HYDROCHLORIDE 5 ML: 10 INJECTION, SOLUTION INFILTRATION; PERINEURAL at 13:59

## 2019-10-03 RX ADMIN — IOPAMIDOL 10 ML: 612 INJECTION, SOLUTION INTRAVENOUS at 13:58

## 2019-10-03 RX ADMIN — GADOBENATE DIMEGLUMINE 26 ML: 529 INJECTION, SOLUTION INTRAVENOUS at 13:58

## 2019-10-21 ENCOUNTER — OFFICE VISIT (OUTPATIENT)
Dept: ORTHOPEDIC SURGERY | Facility: CLINIC | Age: 44
End: 2019-10-21

## 2019-10-21 VITALS — WEIGHT: 146 LBS | HEIGHT: 66 IN | HEART RATE: 99 BPM | OXYGEN SATURATION: 99 % | BODY MASS INDEX: 23.46 KG/M2

## 2019-10-21 DIAGNOSIS — M67.813 BICEPS TENDINOSIS OF RIGHT SHOULDER: ICD-10-CM

## 2019-10-21 DIAGNOSIS — S43.431A TEAR OF RIGHT GLENOID LABRUM, INITIAL ENCOUNTER: Primary | ICD-10-CM

## 2019-10-21 PROCEDURE — 99213 OFFICE O/P EST LOW 20 MIN: CPT | Performed by: ORTHOPAEDIC SURGERY

## 2019-10-21 NOTE — PROGRESS NOTES
[facility] – [place] Orthopaedic Surgery Clinic Note    Subjective     Chief Complaint   Patient presents with   • Follow-up     MRI follow up; Chronic right shoulder pain         HPI    Hung Perales is a 44 y.o. male.  He follows up today for the MRI arthrogram results of his right shoulder.  No new complaints today.  Continues to have pain in the bicipital groove region, which is throbbing, stabbing and shooting.  Pain is worse with certain motions.  Pain has been present for 6 months.      There is no problem list on file for this patient.    History reviewed. No pertinent past medical history.   Past Surgical History:   Procedure Laterality Date   • HIP SURGERY      GUNNAR   • KNEE SURGERY Left    • MANDIBLE SURGERY     • SHOULDER SURGERY Right     18- [facility]      Family History   Problem Relation Age of Onset   • Cancer Father    • Heart attack Father      Social History     Socioeconomic History   • Marital status:      Spouse name: Not on file   • Number of children: Not on file   • Years of education: Not on file   • Highest education level: Not on file   Tobacco Use   • Smoking status: Former Smoker     Types: Cigarettes     Start date:      Last attempt to quit:      Years since quittin.8   • Smokeless tobacco: Never Used   Substance and Sexual Activity   • Alcohol use: Yes     Comment: occasional   • Drug use: No   • Sexual activity: Defer      No current outpatient medications on file prior to visit.     No current facility-administered medications on file prior to visit.       Allergies   Allergen Reactions   • Penicillins Hives        Review of Systems   Constitutional: Negative.    HENT: Negative.    Eyes: Negative.    Respiratory: Negative.    Cardiovascular: Negative.    Gastrointestinal: Negative.    Endocrine: Negative.    Genitourinary: Negative.    Musculoskeletal: Positive for arthralgias.   Skin: Negative.    Allergic/Immunologic: Negative.    Neurological: Negative.    Hematological:  "Negative.    Psychiatric/Behavioral: Negative.         Objective      Physical Exam  Pulse 99   Ht 167.5 cm (65.95\")   Wt 66.2 kg (146 lb)   SpO2 99%   BMI 23.60 kg/m²     Body mass index is 23.6 kg/m².    General:   Mental Status:  Alert   Appearance: Cooperative, in no acute distress   Build and Nutrition: Well-nourished well-developed male   Orientation: Alert and oriented to person, place and time   Posture: Normal   Gait: Normal     Upper Extremities:              Right Shoulder:                          Tenderness:    Tender over the bicipital groove                          Swelling:          None                          Range of motion:        External rotation:         90°                                                              Forward flexion:          180°                                                              Abduction:                   180°  Deformities:     None  Functional testing: Negative drop arm, negative lift-off, positive mild impingement    Imaging/Studies    EXAMINATION: MRI SHOULDER RIGHT ARTHROGRAM- 10/03/2017     INDICATION: Shoulder pain, prior xray, persistent; M25.511-Pain in right  shoulder; G89.29-Other chronic pain     TECHNIQUE: Axial gradient echo, sagittal STIR and T1, coronal T1 and T2  fat-sat images, axial T1 fat-sat images, post intra-articular gadolinium  axial sagittal and coronal T1 fat sat images of the right shoulder.     COMPARISON: Right shoulder arthrogram 03/16/2018     FINDINGS: Axial gradient echo, sagittal T1-T2 fat-sat, coronal T2 fat  sat and T1-weighted images, unenhanced axial T1-weighted images, and  axial sagittal and coronal T1-weighted images performed following  intra-articular administration of dilute gadolinium.  There is good contrast opacification of joint space. Contrast extends  across the base of the superior labrum and into the substance of the  labrum, consistent with a fairly extensive tear. This does not appear to  track into the " biceps anchor which appears intact. Anterior, posterior  and inferior portions of the labrum appear intact.     Non arthrogram images also appear to show superior labral tear. There is  moderately extensive AC joint arthropathy, small subdeltoid bursal  effusion and irregular appearance of the distal supraspinatus, both mild  bursal surface images surface irregularity is. No disruption is seen and  no definite full-thickness tear is identified. Sagittal STIR image 8  series 6 gives the appearance of a partial-thickness undersurface tear  of the supraspinatus. Allowing for motion degraded exam, there is  probably mild subscapularis and infraspinatus tendinopathy, but no  well-defined tear is appreciated. There is no evidence of significant  shoulder muscle atrophy.     IMPRESSION:  1. Fairly extensive tear and partial dehiscence of the superior glenoid  labrum. Remainder of the glenoid labrum appears intact.      2. Motion degraded exam but with diffusely abnormal distal supraspinatus  tendon, whether generalized tendinopathy, or very small irregular bursal  surface and undersurface tears.     3. Suspected mild subscapularis and infraspinatus tendinopathy.         D:  10/07/2019  E:  10/07/2019     This report was finalized on 10/10/2019 8:08 PM by DR. Vince Gusman MD.    Assessment and Plan     Hung was seen today for follow-up.    Diagnoses and all orders for this visit:    Tear of right glenoid labrum, initial encounter    Biceps tendinosis of right shoulder        1. Tear of right glenoid labrum, initial encounter    2. Biceps tendinosis of right shoulder        I reviewed my findings with the patient today.  Appears that he has a superior labral tear, but his rotator cuff appears to be intact.  Symptoms are in the bicipital groove, and I reviewed the MRI arthrogram results with Dr. Munoz, and he also saw the patient today.  He plans for arthroscopic intervention, and discussed this with the patient today.   Please see his discussion for details.    Return for Referred to Dr. Munoz.      Medical Decision Making  Data/Risk: independent visualization of imaging, lab tests, or EMG/NCV      Carter Abreu MD  10/21/19  3:09 PM

## 2021-04-15 ENCOUNTER — TRANSCRIBE ORDERS (OUTPATIENT)
Dept: ADMINISTRATIVE | Facility: HOSPITAL | Age: 46
End: 2021-04-15

## 2021-04-15 DIAGNOSIS — R51.9 NONINTRACTABLE HEADACHE, UNSPECIFIED CHRONICITY PATTERN, UNSPECIFIED HEADACHE TYPE: Primary | ICD-10-CM

## 2021-04-16 ENCOUNTER — HOSPITAL ENCOUNTER (OUTPATIENT)
Dept: CT IMAGING | Facility: HOSPITAL | Age: 46
Discharge: HOME | End: 2021-04-16
Admitting: NURSE PRACTITIONER

## 2021-04-16 DIAGNOSIS — R51.9 NONINTRACTABLE HEADACHE, UNSPECIFIED CHRONICITY PATTERN, UNSPECIFIED HEADACHE TYPE: ICD-10-CM

## 2021-04-16 PROCEDURE — 0 IOPAMIDOL PER 1 ML: Performed by: NURSE PRACTITIONER

## 2021-04-16 PROCEDURE — 70496 CT ANGIOGRAPHY HEAD: CPT

## 2021-04-16 PROCEDURE — 70498 CT ANGIOGRAPHY NECK: CPT

## 2021-04-16 RX ADMIN — IOPAMIDOL 95 ML: 755 INJECTION, SOLUTION INTRAVENOUS at 09:12

## 2021-05-20 ENCOUNTER — LAB REQUISITION (OUTPATIENT)
Dept: LAB | Facility: HOSPITAL | Age: 46
End: 2021-05-20

## 2021-05-20 DIAGNOSIS — J32.9 CHRONIC SINUSITIS, UNSPECIFIED: ICD-10-CM

## 2021-05-20 DIAGNOSIS — J34.3 HYPERTROPHY OF NASAL TURBINATES: ICD-10-CM

## 2021-05-20 DIAGNOSIS — J34.2 DEVIATED NASAL SEPTUM: ICD-10-CM

## 2021-05-20 PROCEDURE — 88311 DECALCIFY TISSUE: CPT | Performed by: OTOLARYNGOLOGY

## 2021-05-20 PROCEDURE — 88305 TISSUE EXAM BY PATHOLOGIST: CPT | Performed by: OTOLARYNGOLOGY

## 2021-05-21 LAB
CYTO UR: NORMAL
LAB AP CASE REPORT: NORMAL
LAB AP CLINICAL INFORMATION: NORMAL
PATH REPORT.FINAL DX SPEC: NORMAL
PATH REPORT.GROSS SPEC: NORMAL

## 2024-09-11 ENCOUNTER — OFFICE VISIT (OUTPATIENT)
Dept: ORTHOPEDIC SURGERY | Facility: CLINIC | Age: 49
End: 2024-09-11
Payer: COMMERCIAL

## 2024-09-11 VITALS
HEIGHT: 65 IN | WEIGHT: 154 LBS | SYSTOLIC BLOOD PRESSURE: 118 MMHG | BODY MASS INDEX: 25.66 KG/M2 | DIASTOLIC BLOOD PRESSURE: 78 MMHG

## 2024-09-11 DIAGNOSIS — Z96.641 HISTORY OF TOTAL RIGHT HIP REPLACEMENT: ICD-10-CM

## 2024-09-11 DIAGNOSIS — S86.112A STRAIN OF GASTROCNEMIUS MUSCLE OF LEFT LOWER EXTREMITY, INITIAL ENCOUNTER: Primary | ICD-10-CM

## 2024-09-11 PROCEDURE — 99203 OFFICE O/P NEW LOW 30 MIN: CPT | Performed by: PHYSICIAN ASSISTANT

## 2024-09-11 RX ORDER — TERBINAFINE HYDROCHLORIDE 250 MG/1
TABLET ORAL
COMMUNITY
Start: 2024-08-19

## 2024-09-11 RX ORDER — TADALAFIL 2.5 MG/1
1 TABLET ORAL DAILY
COMMUNITY

## 2025-02-03 ENCOUNTER — OFFICE VISIT (OUTPATIENT)
Dept: ORTHOPEDIC SURGERY | Facility: CLINIC | Age: 50
End: 2025-02-03
Payer: COMMERCIAL

## 2025-02-03 VITALS
DIASTOLIC BLOOD PRESSURE: 78 MMHG | BODY MASS INDEX: 26.79 KG/M2 | WEIGHT: 160.8 LBS | HEIGHT: 65 IN | SYSTOLIC BLOOD PRESSURE: 120 MMHG

## 2025-02-03 DIAGNOSIS — M25.561 CHRONIC PAIN OF RIGHT KNEE: Primary | ICD-10-CM

## 2025-02-03 DIAGNOSIS — G89.29 CHRONIC PAIN OF RIGHT KNEE: Primary | ICD-10-CM

## 2025-02-03 PROCEDURE — 99214 OFFICE O/P EST MOD 30 MIN: CPT | Performed by: ORTHOPAEDIC SURGERY

## 2025-02-03 RX ORDER — TADALAFIL 5 MG/1
TABLET ORAL
COMMUNITY

## 2025-02-03 NOTE — PROGRESS NOTES
[facility] – [place] [facility] Note    Subjective     Chief Complaint   Patient presents with   • Right Knee - Pain        HPI    Hung Perales is a 49 y.o. male who presents with new problem of: right knee pain.  Onset: atraumatic and gradual in nature. The issue has been ongoing for 3 month(s). Pain is a 5/10 on the pain scale. Pain is described as dull, aching, and throbbing. Associated symptoms include pain and stiffness. The pain is worse with any movement of the joint; resting improve the pain. Previous treatments have included: nothing.  Pain along the anterior and medial aspect of the knee.  No trauma.    I have reviewed the following portions of the patient's history and agree with: History of Present Illness and Review of Systems    There is no problem list on file for this patient.    History reviewed. No pertinent past medical history.   Past Surgical History:   Procedure Laterality Date   • HIP SURGERY      GUNNAR   • KNEE SURGERY Left    • MANDIBLE SURGERY     • SHOULDER SURGERY Right     20- [facility]      Family History   Problem Relation Age of Onset   • Cancer Father    • Heart attack Father      Social History     Socioeconomic History   • Marital status:    Tobacco Use   • Smoking status: Former     Current packs/day: 0.00     Types: Cigarettes     Start date:      Quit date:      Years since quittin.1     Passive exposure: Past   • Smokeless tobacco: Never   Vaping Use   • Vaping status: Never Used   Substance and Sexual Activity   • Alcohol use: Yes     Comment: occasional   • Drug use: No   • Sexual activity: Defer      Current Outpatient Medications on File Prior to Visit   Medication Sig Dispense Refill   • tadalafil (CIALIS) 5 MG tablet take 1 tablet by mouth every day for 90 days     • terbinafine (lamiSIL) 250 MG tablet TAKE 1 TABLET DAILY X 6 WEEKS THEN HAVE LABS DRAWN. IF NORMAL, WILL COMPLETE 12 WEEKS OF THERAPY. (Patient not taking: Reported on 2/3/2025)     •  [DISCONTINUED] tadalafil (CIALIS) 2.5 MG tablet Take 1 tablet by mouth Daily.       No current facility-administered medications on file prior to visit.      Allergies   Allergen Reactions   • Penicillins Hives   • Penicillin G Rash        Review of Systems   Constitutional:  Negative for activity change, appetite change, chills, diaphoresis, fatigue, fever and unexpected weight change.   HENT:  Negative for congestion, dental problem, drooling, ear discharge, ear pain, facial swelling, hearing loss, mouth sores, nosebleeds, postnasal drip, rhinorrhea, sinus pressure, sneezing, sore throat, tinnitus, trouble swallowing and voice change.    Eyes:  Negative for photophobia, pain, discharge, redness, itching and visual disturbance.   Respiratory:  Negative for apnea, cough, choking, chest tightness, shortness of breath, wheezing and stridor.    Cardiovascular:  Negative for chest pain, palpitations and leg swelling.   Gastrointestinal:  Negative for abdominal distention, abdominal pain, anal bleeding, blood in stool, constipation, diarrhea, nausea, rectal pain and vomiting.   Endocrine: Negative for cold intolerance, heat intolerance, polydipsia, polyphagia and polyuria.   Genitourinary:  Negative for decreased urine volume, difficulty urinating, dysuria, enuresis, flank pain, frequency, genital sores, hematuria and urgency.   Musculoskeletal:  Positive for arthralgias. Negative for back pain, gait problem, joint swelling, myalgias, neck pain and neck stiffness.   Skin:  Negative for color change, pallor, rash and wound.   Allergic/Immunologic: Negative for environmental allergies, food allergies and immunocompromised state.   Neurological:  Negative for dizziness, tremors, seizures, syncope, facial asymmetry, speech difficulty, weakness, light-headedness, numbness and headaches.   Hematological:  Negative for adenopathy. Does not bruise/bleed easily.   Psychiatric/Behavioral:  Negative for agitation, behavioral  "problems, confusion, decreased concentration, dysphoric mood, hallucinations, self-injury, sleep disturbance and suicidal ideas. The patient is not nervous/anxious and is not hyperactive.         Objective      Physical Exam  /78   Ht 165 cm (64.96\")   Wt 72.9 kg (160 lb 12.8 oz)   BMI 26.79 kg/m²     Body mass index is 26.79 kg/m².  BMI is >= 25 and <30. (Overweight) The following options were offered after discussion;: referral to primary care        General:   Mental Status:  Alert   Appearance: Cooperative, in no acute distress   Build and Nutrition: Well-nourished well-developed male   Orientation: Alert and oriented to person, place and time   Posture: Normal   Gait: Nonantalgic    Integument:   Right knee: No skin lesions, no rash, no ecchymosis    Neurologic:   Sensation:    Right foot: Intact to light touch on the dorsal and plantar aspect   Motor:  Right lower extremity: 5/5 quadriceps, hamstrings, ankle dorsiflexors, and ankle plantar flexors    Vascular:   Right lower extremity: 2+ dorsalis pedis pulse, prompt capillary refill    Lower Extremities:   Right Knee:    Tenderness:  None    Effusion:  None    Swelling:  None    Crepitus:  Soft    Atrophy:  None    Range of motion:  Extension: 0°       Flexion: 125°  Instability:  No varus laxity, no valgus laxity, negative anterior drawer  Deformities:  None      Imaging/Studies      Imaging Results (Last 24 Hours)       Procedure Component Value Units Date/Time    XR Knee 4+ View Right [[id number]] Resulted: 02/03/25 1508     Updated: 02/03/25 1508    Narrative:      Right Knee Radiographs  Indication: right knee pain  Views: Standing AP's and skiers of both knees, with lateral and sunrise   views of the right knee    Comparison: no prior studies available    Findings:    No acute bony abnormalities, good alignment, no unusual bony features.              Assessment and Plan     Diagnoses and all orders for this visit:    1. Chronic pain of right knee " (Primary)  -     XR Knee 4+ View Right  -     MRI Knee Right Without Contrast; Future        1. Chronic pain of right knee          I reviewed my findings with the patient.  He has persistent right knee pain, primarily along the anterior and medial aspects of the knee.  X-rays show no obvious bony abnormalities, and at this point I recommend an MRI for further evaluation.  I will see him back after the MRI for review, but sooner for problems.  Specifically  I am looking for any evidence of meniscal involvement.    Return for after imaging study.      Carter Abreu MD  02/03/25  15:16 EST      Dictated Utilizing Dragon Dictation

## 2025-02-06 ENCOUNTER — TELEPHONE (OUTPATIENT)
Dept: ORTHOPEDIC SURGERY | Facility: CLINIC | Age: 50
End: 2025-02-06
Payer: COMMERCIAL

## 2025-02-06 NOTE — TELEPHONE ENCOUNTER
DELETE AFTER REVIEWING: Send this encounter to the appropriate pool. See your Call Action Grid or Workflows for direction.    Caller: DELFIN WITH [facility] FOR [facility]    Relationship: REFERRING PROVIDER OFFICE    Best call back number: [phone]    What form or medical record are you requesting: REQUESTING OFFICE NOTE AND XRAY REPORTS IF ANY FAXED [phone]

## 2025-02-14 ENCOUNTER — TELEPHONE (OUTPATIENT)
Dept: ORTHOPEDIC SURGERY | Facility: CLINIC | Age: 50
End: 2025-02-14

## 2025-02-14 NOTE — TELEPHONE ENCOUNTER
Caller: HELEN    Relationship: [facility] [employer] call back number: [phone]    What orders are you requesting (i.e. lab or imaging): RIGHT KNEE MRI     In what timeframe would the patient need to come in: 2-15-25 IS PATIENT APPOINTMENT     Where will you receive your lab/imaging services: [facility]- [phone]

## 2025-02-25 ENCOUNTER — TELEPHONE (OUTPATIENT)
Dept: ORTHOPEDIC SURGERY | Facility: CLINIC | Age: 50
End: 2025-02-25
Payer: COMMERCIAL

## 2025-03-03 ENCOUNTER — OFFICE VISIT (OUTPATIENT)
Dept: ORTHOPEDIC SURGERY | Facility: CLINIC | Age: 50
End: 2025-03-03
Payer: COMMERCIAL

## 2025-03-03 VITALS
SYSTOLIC BLOOD PRESSURE: 130 MMHG | DIASTOLIC BLOOD PRESSURE: 72 MMHG | HEIGHT: 65 IN | WEIGHT: 160 LBS | BODY MASS INDEX: 26.66 KG/M2

## 2025-03-03 DIAGNOSIS — M25.861 KNEE JOINT CYST, RIGHT: Primary | ICD-10-CM

## 2025-03-03 RX ORDER — TRIAMCINOLONE ACETONIDE 40 MG/ML
40 INJECTION, SUSPENSION INTRA-ARTICULAR; INTRAMUSCULAR
Status: COMPLETED | OUTPATIENT
Start: 2025-03-03 | End: 2025-03-03

## 2025-03-03 RX ORDER — ROPIVACAINE HYDROCHLORIDE 5 MG/ML
1 INJECTION, SOLUTION EPIDURAL; INFILTRATION; PERINEURAL
Status: COMPLETED | OUTPATIENT
Start: 2025-03-03 | End: 2025-03-03

## 2025-03-03 RX ADMIN — TRIAMCINOLONE ACETONIDE 40 MG: 40 INJECTION, SUSPENSION INTRA-ARTICULAR; INTRAMUSCULAR at 09:30

## 2025-03-03 RX ADMIN — ROPIVACAINE HYDROCHLORIDE 1 ML: 5 INJECTION, SOLUTION EPIDURAL; INFILTRATION; PERINEURAL at 09:30

## 2025-03-03 NOTE — PROGRESS NOTES
[facility] – [place] [facility] Note    Subjective     Chief Complaint   Patient presents with    Follow-up     1 month follow up -- Chronic pain of right knee -- MRI completed 2/15/25        HPI    It has been 1  month(s) since Mr. Perales's last visit. He returns to clinic today for follow-up of right knee pain. The issue has been ongoing for 2 month(s). He rates his pain a 2/10 on the pain scale. Previous/current treatments: nothing. Current symptoms: pain. The pain is worse with  kneeling and putting pressure on it ; resting improve the pain. Overall, he is doing the same.  Here today to review the MRI results.      I have reviewed the following portions of the patient's history and agree with: History of Present Illness and Review of Systems    There is no problem list on file for this patient.    History reviewed. No pertinent past medical history.   Past Surgical History:   Procedure Laterality Date    HIP SURGERY      GUNNAR    KNEE SURGERY Left     MANDIBLE SURGERY      SHOULDER SURGERY Right     20- [facility]      Family History   Problem Relation Age of Onset    Cancer Father     Heart attack Father      Social History     Socioeconomic History    Marital status:    Tobacco Use    Smoking status: Former     Current packs/day: 0.00     Types: Cigarettes     Start date:      Quit date:      Years since quittin.1     Passive exposure: Past    Smokeless tobacco: Never   Vaping Use    Vaping status: Never Used   Substance and Sexual Activity    Alcohol use: Yes     Comment: occasional    Drug use: No    Sexual activity: Defer      Current Outpatient Medications on File Prior to Visit   Medication Sig Dispense Refill    tadalafil (CIALIS) 5 MG tablet take 1 tablet by mouth every day for 90 days      terbinafine (lamiSIL) 250 MG tablet        No current facility-administered medications on file prior to visit.      Allergies   Allergen Reactions    Penicillins Hives    Penicillin G Rash         Review of Systems   Constitutional:  Negative for activity change, appetite change, chills, diaphoresis, fatigue, fever and unexpected weight change.   HENT:  Negative for congestion, dental problem, drooling, ear discharge, ear pain, facial swelling, hearing loss, mouth sores, nosebleeds, postnasal drip, rhinorrhea, sinus pressure, sneezing, sore throat, tinnitus, trouble swallowing and voice change.    Eyes:  Negative for photophobia, pain, discharge, redness, itching and visual disturbance.   Respiratory:  Negative for apnea, cough, choking, chest tightness, shortness of breath, wheezing and stridor.    Cardiovascular:  Negative for chest pain, palpitations and leg swelling.   Gastrointestinal:  Negative for abdominal distention, abdominal pain, anal bleeding, blood in stool, constipation, diarrhea, nausea, rectal pain and vomiting.   Endocrine: Negative for cold intolerance, heat intolerance, polydipsia, polyphagia and polyuria.   Genitourinary:  Negative for decreased urine volume, difficulty urinating, dysuria, enuresis, flank pain, frequency, genital sores, hematuria and urgency.   Musculoskeletal:  Positive for arthralgias. Negative for back pain, gait problem, joint swelling, myalgias, neck pain and neck stiffness.   Skin:  Negative for color change, pallor, rash and wound.   Allergic/Immunologic: Negative for environmental allergies, food allergies and immunocompromised state.   Neurological:  Negative for dizziness, tremors, seizures, syncope, facial asymmetry, speech difficulty, weakness, light-headedness, numbness and headaches.   Hematological:  Negative for adenopathy. Does not bruise/bleed easily.   Psychiatric/Behavioral:  Negative for agitation, behavioral problems, confusion, decreased concentration, dysphoric mood, hallucinations, self-injury, sleep disturbance and suicidal ideas. The patient is not nervous/anxious and is not hyperactive.         Objective      Physical Exam  /72   Ht  "165.1 cm (65\")   Wt 72.6 kg (160 lb)   BMI 26.63 kg/m²     Body mass index is 26.63 kg/m².         General:   Mental Status:  Alert   Appearance: Cooperative, in no acute distress   Build and Nutrition: Well-nourished well-developed male   Orientation: Alert and oriented to person, place and time   Posture: Normal   Gait: Normal/nonantalgic    Integument:   Right knee: No skin lesions, no rash, no ecchymosis    Lower Extremities:   Right Knee:    Tenderness:  Posterior medial knee, overlying the palpable cyst    Effusion:  None    Swelling: None    Crepitus:  Soft    Range of motion:  Extension: 0°       Flexion: 130°  Instability:  No varus laxity, no valgus laxity, negative anterior drawer  Deformities:  None      Imaging/Studies  Imaging Results (Last 24 Hours)       ** No results found for the last 24 hours. **          MRI of the right knee from [facility] from 2/17/2025 was reviewed, which showed a multilocular fluid collection posterior medial knee representing a ganglion cyst or bursa.  No meniscus tear.    Assessment and Plan     Diagnoses and all orders for this visit:    1. Knee joint cyst, right (Primary)  -     - Large Joint Arthrocentesis: R knee        1. Knee joint cyst, right          I reviewed my findings with the patient today.  We discussed options for his right knee ganglion cyst, and he would like to proceed with an aspiration and injection.  The possibility of recurrence has been discussed.  Surgical intervention may be entertained in the future if he has continued symptoms.  I will see him back in 6 weeks, but I will be happy to see him back sooner for any problems.    Procedure Note:  The potential benefits of performing a right knee ganglion cyst aspiration and injection, as well as potential risks (including, but not limited to infection, swelling, pain, bleeding, bruising, nerve/blood vessel damage, skin color changes, transient elevation in blood glucose levels, and fat atrophy) " were discussed with the patient.  After informed consent, timeout procedure was performed, and the skin on the right knee was prepped with chlorhexidine soap and alcohol, after which ethyl chloride was applied to the skin at the injection site.  Via the approach overlying the ganglion cyst which was palpable on the posterior medial aspect of the knee, 1 cc of gelatinous ganglion cyst fluid was aspirated then 1ml of Kenalog 40mg/ml mixed with 1ml 0.5% ropivacaine plain was injected into the cyst.  The patient tolerated the procedure well, experiencing 100% improvement a few minutes following the injection. There were no complications.  Band-Aid was applied to the injection site. Post-procedural instructions were given to the patient and/or their caregiver.      Return in about 6 weeks (around 4/14/2025).      Carter Abreu MD  03/03/25  09:33 EST    Dictated Utilizing Dragon Dictation

## 2025-03-03 NOTE — PROGRESS NOTES
Procedure   - Large Joint Arthrocentesis: R knee on 3/3/2025 9:30 AM  Indications: pain  Details: 21 G needle, anterolateral approach  Medications: 40 mg triamcinolone acetonide 40 MG/ML; 1 mL ropivacaine 0.5 %  Aspirate: 1 mL blood-tinged  Outcome: tolerated well, no immediate complications  Procedure, treatment alternatives, risks and benefits explained, specific risks discussed. Consent was given by the patient. Immediately prior to procedure a time out was called to verify the correct patient, procedure, equipment, support staff and site/side marked as required. Patient was prepped and draped in the usual sterile fashion.

## 2025-04-25 ENCOUNTER — TELEPHONE (OUTPATIENT)
Dept: ORTHOPEDIC SURGERY | Facility: CLINIC | Age: 50
End: 2025-04-25
Payer: COMMERCIAL

## 2025-04-25 NOTE — TELEPHONE ENCOUNTER
Patient to follow-up after his knee cyst aspiration.  He did not follow-up on his last visit, but he says that he is doing well with improved symptoms after the aspiration.  He will follow-up with me in the future if he has any worsening or problems.    Carter Abreu MD  04/25/25  11:54 EDT